# Patient Record
Sex: FEMALE | URBAN - METROPOLITAN AREA
[De-identification: names, ages, dates, MRNs, and addresses within clinical notes are randomized per-mention and may not be internally consistent; named-entity substitution may affect disease eponyms.]

---

## 2020-02-06 ENCOUNTER — OFFICE VISIT (OUTPATIENT)
Dept: URBAN - METROPOLITAN AREA CLINIC 76 | Facility: CLINIC | Age: 30
End: 2020-02-06
Payer: COMMERCIAL

## 2020-02-06 DIAGNOSIS — H52.222 REGULAR ASTIGMATISM, LEFT EYE: Primary | ICD-10-CM

## 2020-02-06 DIAGNOSIS — H16.223 KERATOCONJUNCT SICCA, NOT SPECIFIED AS SJOGREN'S, BILATERAL: ICD-10-CM

## 2020-02-06 PROCEDURE — 92004 COMPRE OPH EXAM NEW PT 1/>: CPT | Performed by: OPTOMETRIST

## 2020-02-06 ASSESSMENT — KERATOMETRY
OS: 45.00
OD: 44.88

## 2020-02-06 ASSESSMENT — VISUAL ACUITY
OS: 20/20
OD: 20/20

## 2020-02-06 ASSESSMENT — INTRAOCULAR PRESSURE
OS: 14
OD: 13

## 2020-02-06 NOTE — IMPRESSION/PLAN
Impression: Regular astigmatism, left eye: H52.222. Plan: Discussed diagnosis with patient. No need for glasses at this time.

## 2020-02-06 NOTE — IMPRESSION/PLAN
Impression: Keratoconjunct sicca, not specified as Sjogren's, bilateral: R17.208. Plan: Discussed diagnosis in detail with patient. Warm compresses with lid massage from top / down, bottom / up, and sweep from inside / out x 2. Patient instructed to use emulsive base lubricant 3-4 x a day. Increase omega foods/nuts and/or Borage oil supplement 1500-2500mg capsule orally per day. If no improvement may start Restasis at next visit.

## 2020-03-05 ENCOUNTER — OFFICE VISIT (OUTPATIENT)
Dept: URBAN - METROPOLITAN AREA CLINIC 76 | Facility: CLINIC | Age: 30
End: 2020-03-05
Payer: COMMERCIAL

## 2020-03-05 PROCEDURE — 92012 INTRM OPH EXAM EST PATIENT: CPT | Performed by: OPTOMETRIST

## 2020-03-05 RX ORDER — CYCLOSPORINE 0.5 MG/ML
0.05 % EMULSION OPHTHALMIC
Qty: 180 | Refills: 3 | Status: ACTIVE
Start: 2020-03-05

## 2020-03-05 ASSESSMENT — INTRAOCULAR PRESSURE
OS: 14
OD: 14

## 2020-03-05 NOTE — IMPRESSION/PLAN
Impression: Keratoconjunct sicca, not specified as Sjogren's, bilateral: R92.458. Plan: Rediscussed diagnosis in detail with patient. Warm compresses with lid massage from top / down, bottom / up, and sweep from inside / out x 2. Patient instructed to use emulsive base lubricant 3-4 x a day. Increase omega foods/nuts and/or Borage oil supplement 1500-2500mg capsule orally per day. Start Restasis BID OU. 2 month fu. Pt is to call with any concerns. May need to consider punctual plug OS. OS inserted.